# Patient Record
Sex: MALE | Race: WHITE | NOT HISPANIC OR LATINO
[De-identification: names, ages, dates, MRNs, and addresses within clinical notes are randomized per-mention and may not be internally consistent; named-entity substitution may affect disease eponyms.]

---

## 2019-09-11 PROBLEM — Z00.00 ENCOUNTER FOR PREVENTIVE HEALTH EXAMINATION: Status: ACTIVE | Noted: 2019-09-11

## 2019-09-24 ENCOUNTER — APPOINTMENT (OUTPATIENT)
Dept: UROLOGY | Facility: CLINIC | Age: 74
End: 2019-09-24
Payer: MEDICARE

## 2019-09-24 DIAGNOSIS — C61 MALIGNANT NEOPLASM OF PROSTATE: ICD-10-CM

## 2019-09-24 PROCEDURE — 99204 OFFICE O/P NEW MOD 45 MIN: CPT

## 2019-09-24 NOTE — HISTORY OF PRESENT ILLNESS
[FreeTextEntry1] : CC: Prostate cancer\par \par HPI:   Dr. Olivera performed biopsy on this 73 year old healthy man for PSA of "four point something".  MRI was performed.  Biopsy revealed GS6 and 7 on right side, base, mid and apex.  Negative left side. \par This was first prostate biopsy.  \par \par No major urinary complaints.   Erections "not the greatest".  He is not currently on PDEI or other medications, however, he has tried them and "they seem to work".  Without medication, erections are probably not sufficient for vaginal penetration.   \par \par FAMHX: Negative CAP \par SURGHX: Hernia, Appendectomy \par SOCIAL:  Nonsmoker, , no children, active, exercise regularly \par ROS: Negative 10 system review \par

## 2019-09-24 NOTE — ASSESSMENT
[FreeTextEntry1] : Intermediate risk CAP\par Today we discussed the natural history of localized prostate cancer, and the heterogeneous biology of this malignancy.  We discussed the fact that many prostate cancers are slow growing and unlikely to metastasize or cause death, whereas others can be life threatening.  We reviewed risk stratification, staging, Tima scoring, and PSA levels as they pertain to risk.  \par \par All treatment options were reviewed.  This included active surveillance, androgen deprivation, emerging options such as focal therapy/HIFU/cryotherapy, radiation options (including IMRT, SBRT, brachytherapy) and surgical options (open vs. robotic surgery, nerve vs. non-nerve sparing).  Oncologic outcomes were compared and contrasted.  Risks of biochemical and clinical recurrence discussed.  Risks of needing adjuvant or salvage treatments reviewed.  We discussed the risks of secondary malignancy after radiation.  We discussed the opportunity for pathological staging with surgery vs. other options.  We discussed the possibility of positive margins, treatment failure, cancer recurrence and cancer-related death even with treatment. \par \par All potential side effects of treatment were reviewed including, but not limited to: short term or permanent stress urinary incontinence and/or short term or permanent erectile dysfunction, penile shortening, rectal symptoms/pain, perineal pain, and other side effects. \par \par All potential complications of treatment and surgery were reviewed including, but not limited to: risks of conversion from MIS to open surgery discussed, bleeding//life-threatening hemorrhage, rectal injury requiring colostomy or delayed recognition leading to fistula, vascular/bowel/adjacent visceral organ injury, trocar/access injury, the possibility of recognized vs. unrecognized/delayed-recognition injury, risks of thermal/blunt/sharp/retraction injury, risks of DVT, PE, MI, death, risks of cardiopulmonary/anesthesia related complications, positional injury, infection/collection/abscess, wound complications/dehiscence/seroma/cellulitis, urinoma/fistula, ureteral injury/obstruction, bladder neck contracture, penile shortening, meatal stenosis, urethral stricture, lymphocele, obturator nerve injury, prolonged anastomotic leak, and other complications. \par \par \par \par \par \par \par \par

## 2019-09-27 ENCOUNTER — RESULT REVIEW (OUTPATIENT)
Age: 74
End: 2019-09-27

## 2019-09-27 ENCOUNTER — OUTPATIENT (OUTPATIENT)
Dept: OUTPATIENT SERVICES | Facility: HOSPITAL | Age: 74
LOS: 1 days | End: 2019-09-27
Payer: MEDICARE

## 2019-09-27 DIAGNOSIS — C61 MALIGNANT NEOPLASM OF PROSTATE: ICD-10-CM

## 2019-09-27 LAB — SURGICAL PATHOLOGY STUDY: SIGNIFICANT CHANGE UP

## 2019-09-27 PROCEDURE — 88321 CONSLTJ&REPRT SLD PREP ELSWR: CPT

## 2019-11-01 VITALS
HEART RATE: 70 BPM | WEIGHT: 186.73 LBS | TEMPERATURE: 98 F | RESPIRATION RATE: 18 BRPM | HEIGHT: 70 IN | SYSTOLIC BLOOD PRESSURE: 159 MMHG | DIASTOLIC BLOOD PRESSURE: 82 MMHG | OXYGEN SATURATION: 96 %

## 2019-11-01 RX ORDER — AMOXICILLIN AND CLAVULANATE POTASSIUM 875; 125 MG/1; MG/1
875-125 TABLET, COATED ORAL
Qty: 6 | Refills: 0 | Status: ACTIVE | COMMUNITY
Start: 2019-11-01 | End: 1900-01-01

## 2019-11-01 NOTE — PATIENT PROFILE ADULT - NSPROEDALEARNPREF_GEN_A_NUR
individual instruction/verbal instruction/written material audio/written material/verbal instruction/individual instruction

## 2019-11-04 ENCOUNTER — APPOINTMENT (OUTPATIENT)
Dept: UROLOGY | Facility: HOSPITAL | Age: 74
End: 2019-11-04

## 2019-11-04 ENCOUNTER — RESULT REVIEW (OUTPATIENT)
Age: 74
End: 2019-11-04

## 2019-11-04 ENCOUNTER — INPATIENT (INPATIENT)
Facility: HOSPITAL | Age: 74
LOS: 1 days | Discharge: HOME | DRG: 708 | End: 2019-11-06
Attending: UROLOGY | Admitting: UROLOGY
Payer: MEDICARE

## 2019-11-04 DIAGNOSIS — Z90.49 ACQUIRED ABSENCE OF OTHER SPECIFIED PARTS OF DIGESTIVE TRACT: Chronic | ICD-10-CM

## 2019-11-04 DIAGNOSIS — Z98.49 CATARACT EXTRACTION STATUS, UNSPECIFIED EYE: Chronic | ICD-10-CM

## 2019-11-04 DIAGNOSIS — Z98.890 OTHER SPECIFIED POSTPROCEDURAL STATES: Chronic | ICD-10-CM

## 2019-11-04 DIAGNOSIS — Z90.89 ACQUIRED ABSENCE OF OTHER ORGANS: Chronic | ICD-10-CM

## 2019-11-04 LAB
ANION GAP SERPL CALC-SCNC: 11 MMOL/L — SIGNIFICANT CHANGE UP (ref 5–17)
BASOPHILS # BLD AUTO: 0.04 K/UL — SIGNIFICANT CHANGE UP (ref 0–0.2)
BASOPHILS NFR BLD AUTO: 0.2 % — SIGNIFICANT CHANGE UP (ref 0–2)
BUN SERPL-MCNC: 16 MG/DL — SIGNIFICANT CHANGE UP (ref 7–23)
CALCIUM SERPL-MCNC: 8.6 MG/DL — SIGNIFICANT CHANGE UP (ref 8.4–10.5)
CHLORIDE SERPL-SCNC: 105 MMOL/L — SIGNIFICANT CHANGE UP (ref 96–108)
CO2 SERPL-SCNC: 26 MMOL/L — SIGNIFICANT CHANGE UP (ref 22–31)
CREAT SERPL-MCNC: 0.94 MG/DL — SIGNIFICANT CHANGE UP (ref 0.5–1.3)
EOSINOPHIL # BLD AUTO: 0.04 K/UL — SIGNIFICANT CHANGE UP (ref 0–0.5)
EOSINOPHIL NFR BLD AUTO: 0.2 % — SIGNIFICANT CHANGE UP (ref 0–6)
GLUCOSE BLDC GLUCOMTR-MCNC: 137 MG/DL — HIGH (ref 70–99)
GLUCOSE BLDC GLUCOMTR-MCNC: 90 MG/DL — SIGNIFICANT CHANGE UP (ref 70–99)
GLUCOSE SERPL-MCNC: 154 MG/DL — HIGH (ref 70–99)
HCT VFR BLD CALC: 44 % — SIGNIFICANT CHANGE UP (ref 39–50)
HGB BLD-MCNC: 14.7 G/DL — SIGNIFICANT CHANGE UP (ref 13–17)
IMM GRANULOCYTES NFR BLD AUTO: 0.3 % — SIGNIFICANT CHANGE UP (ref 0–1.5)
LYMPHOCYTES # BLD AUTO: 1.11 K/UL — SIGNIFICANT CHANGE UP (ref 1–3.3)
LYMPHOCYTES # BLD AUTO: 5.8 % — LOW (ref 13–44)
MCHC RBC-ENTMCNC: 32.6 PG — SIGNIFICANT CHANGE UP (ref 27–34)
MCHC RBC-ENTMCNC: 33.4 GM/DL — SIGNIFICANT CHANGE UP (ref 32–36)
MCV RBC AUTO: 97.6 FL — SIGNIFICANT CHANGE UP (ref 80–100)
MONOCYTES # BLD AUTO: 0.38 K/UL — SIGNIFICANT CHANGE UP (ref 0–0.9)
MONOCYTES NFR BLD AUTO: 2 % — SIGNIFICANT CHANGE UP (ref 2–14)
NEUTROPHILS # BLD AUTO: 17.4 K/UL — HIGH (ref 1.8–7.4)
NEUTROPHILS NFR BLD AUTO: 91.5 % — HIGH (ref 43–77)
NRBC # BLD: 0 /100 WBCS — SIGNIFICANT CHANGE UP (ref 0–0)
PLATELET # BLD AUTO: 279 K/UL — SIGNIFICANT CHANGE UP (ref 150–400)
POTASSIUM SERPL-MCNC: 4.3 MMOL/L — SIGNIFICANT CHANGE UP (ref 3.5–5.3)
POTASSIUM SERPL-SCNC: 4.3 MMOL/L — SIGNIFICANT CHANGE UP (ref 3.5–5.3)
RBC # BLD: 4.51 M/UL — SIGNIFICANT CHANGE UP (ref 4.2–5.8)
RBC # FLD: 11.9 % — SIGNIFICANT CHANGE UP (ref 10.3–14.5)
SODIUM SERPL-SCNC: 142 MMOL/L — SIGNIFICANT CHANGE UP (ref 135–145)
WBC # BLD: 19.03 K/UL — HIGH (ref 3.8–10.5)
WBC # FLD AUTO: 19.03 K/UL — HIGH (ref 3.8–10.5)

## 2019-11-04 PROCEDURE — 38571 LAPAROSCOPY LYMPHADENECTOMY: CPT

## 2019-11-04 PROCEDURE — 55866 LAPS SURG PRST8ECT RPBIC RAD: CPT

## 2019-11-04 RX ORDER — SODIUM CHLORIDE 9 MG/ML
1000 INJECTION, SOLUTION INTRAVENOUS
Refills: 0 | Status: DISCONTINUED | OUTPATIENT
Start: 2019-11-04 | End: 2019-11-06

## 2019-11-04 RX ORDER — LISINOPRIL 2.5 MG/1
1 TABLET ORAL
Qty: 0 | Refills: 0 | DISCHARGE

## 2019-11-04 RX ORDER — KETOROLAC TROMETHAMINE 30 MG/ML
15 SYRINGE (ML) INJECTION EVERY 6 HOURS
Refills: 0 | Status: COMPLETED | OUTPATIENT
Start: 2019-11-04 | End: 2019-11-06

## 2019-11-04 RX ORDER — ENOXAPARIN SODIUM 100 MG/ML
40 INJECTION SUBCUTANEOUS DAILY
Refills: 0 | Status: DISCONTINUED | OUTPATIENT
Start: 2019-11-05 | End: 2019-11-04

## 2019-11-04 RX ORDER — GABAPENTIN 400 MG/1
600 CAPSULE ORAL ONCE
Refills: 0 | Status: COMPLETED | OUTPATIENT
Start: 2019-11-04 | End: 2019-11-04

## 2019-11-04 RX ORDER — LIDOCAINE 4 G/100G
1 CREAM TOPICAL
Refills: 0 | Status: DISCONTINUED | OUTPATIENT
Start: 2019-11-04 | End: 2019-11-06

## 2019-11-04 RX ORDER — SENNA PLUS 8.6 MG/1
2 TABLET ORAL AT BEDTIME
Refills: 0 | Status: DISCONTINUED | OUTPATIENT
Start: 2019-11-04 | End: 2019-11-06

## 2019-11-04 RX ORDER — DIAZEPAM 5 MG
5 TABLET ORAL THREE TIMES A DAY
Refills: 0 | Status: DISCONTINUED | OUTPATIENT
Start: 2019-11-04 | End: 2019-11-06

## 2019-11-04 RX ORDER — CEFAZOLIN SODIUM 1 G
VIAL (EA) INJECTION
Refills: 0 | Status: DISCONTINUED | OUTPATIENT
Start: 2019-11-04 | End: 2019-11-04

## 2019-11-04 RX ORDER — BUPIVACAINE 13.3 MG/ML
20 INJECTION, SUSPENSION, LIPOSOMAL INFILTRATION ONCE
Refills: 0 | Status: DISCONTINUED | OUTPATIENT
Start: 2019-11-04 | End: 2019-11-06

## 2019-11-04 RX ORDER — VITAMIN E 100 UNIT
0 CAPSULE ORAL
Qty: 0 | Refills: 0 | DISCHARGE

## 2019-11-04 RX ORDER — CEFAZOLIN SODIUM 1 G
2000 VIAL (EA) INJECTION EVERY 8 HOURS
Refills: 0 | Status: COMPLETED | OUTPATIENT
Start: 2019-11-04 | End: 2019-11-05

## 2019-11-04 RX ORDER — ACETAMINOPHEN 500 MG
1000 TABLET ORAL ONCE
Refills: 0 | Status: COMPLETED | OUTPATIENT
Start: 2019-11-04 | End: 2019-11-04

## 2019-11-04 RX ORDER — ACETAMINOPHEN 500 MG
1000 TABLET ORAL EVERY 6 HOURS
Refills: 0 | Status: DISCONTINUED | OUTPATIENT
Start: 2019-11-04 | End: 2019-11-06

## 2019-11-04 RX ORDER — OXYCODONE HYDROCHLORIDE 5 MG/1
10 TABLET ORAL EVERY 4 HOURS
Refills: 0 | Status: DISCONTINUED | OUTPATIENT
Start: 2019-11-04 | End: 2019-11-06

## 2019-11-04 RX ORDER — OXYCODONE HYDROCHLORIDE 5 MG/1
5 TABLET ORAL EVERY 4 HOURS
Refills: 0 | Status: DISCONTINUED | OUTPATIENT
Start: 2019-11-04 | End: 2019-11-06

## 2019-11-04 RX ORDER — METOCLOPRAMIDE HCL 10 MG
10 TABLET ORAL EVERY 6 HOURS
Refills: 0 | Status: DISCONTINUED | OUTPATIENT
Start: 2019-11-04 | End: 2019-11-06

## 2019-11-04 RX ORDER — ENOXAPARIN SODIUM 100 MG/ML
40 INJECTION SUBCUTANEOUS EVERY 24 HOURS
Refills: 0 | Status: DISCONTINUED | OUTPATIENT
Start: 2019-11-05 | End: 2019-11-06

## 2019-11-04 RX ORDER — ENOXAPARIN SODIUM 100 MG/ML
40 INJECTION SUBCUTANEOUS DAILY
Refills: 0 | Status: DISCONTINUED | OUTPATIENT
Start: 2019-11-04 | End: 2019-11-04

## 2019-11-04 RX ORDER — ENOXAPARIN SODIUM 100 MG/ML
40 INJECTION SUBCUTANEOUS ONCE
Refills: 0 | Status: COMPLETED | OUTPATIENT
Start: 2019-11-04 | End: 2019-11-04

## 2019-11-04 RX ORDER — HYDROMORPHONE HYDROCHLORIDE 2 MG/ML
0.5 INJECTION INTRAMUSCULAR; INTRAVENOUS; SUBCUTANEOUS
Refills: 0 | Status: DISCONTINUED | OUTPATIENT
Start: 2019-11-04 | End: 2019-11-06

## 2019-11-04 RX ORDER — OMEGA-3 ACID ETHYL ESTERS 1 G
1 CAPSULE ORAL
Qty: 0 | Refills: 0 | DISCHARGE

## 2019-11-04 RX ORDER — SIMVASTATIN 20 MG/1
1 TABLET, FILM COATED ORAL
Qty: 0 | Refills: 0 | DISCHARGE

## 2019-11-04 RX ORDER — ONDANSETRON 8 MG/1
4 TABLET, FILM COATED ORAL EVERY 6 HOURS
Refills: 0 | Status: DISCONTINUED | OUTPATIENT
Start: 2019-11-04 | End: 2019-11-06

## 2019-11-04 RX ORDER — OXYBUTYNIN CHLORIDE 5 MG
5 TABLET ORAL THREE TIMES A DAY
Refills: 0 | Status: DISCONTINUED | OUTPATIENT
Start: 2019-11-04 | End: 2019-11-06

## 2019-11-04 RX ADMIN — GABAPENTIN 600 MILLIGRAM(S): 400 CAPSULE ORAL at 06:22

## 2019-11-04 RX ADMIN — LIDOCAINE 1 APPLICATION(S): 4 CREAM TOPICAL at 21:52

## 2019-11-04 RX ADMIN — Medication 15 MILLIGRAM(S): at 23:12

## 2019-11-04 RX ADMIN — Medication 2000 MILLIGRAM(S): at 14:48

## 2019-11-04 RX ADMIN — Medication 1000 MILLIGRAM(S): at 18:47

## 2019-11-04 RX ADMIN — Medication 1000 MILLIGRAM(S): at 17:52

## 2019-11-04 RX ADMIN — SODIUM CHLORIDE 75 MILLILITER(S): 9 INJECTION, SOLUTION INTRAVENOUS at 12:58

## 2019-11-04 RX ADMIN — LIDOCAINE 1 APPLICATION(S): 4 CREAM TOPICAL at 12:08

## 2019-11-04 RX ADMIN — LIDOCAINE 1 APPLICATION(S): 4 CREAM TOPICAL at 23:16

## 2019-11-04 RX ADMIN — Medication 1000 MILLIGRAM(S): at 06:22

## 2019-11-04 RX ADMIN — ENOXAPARIN SODIUM 40 MILLIGRAM(S): 100 INJECTION SUBCUTANEOUS at 06:28

## 2019-11-04 RX ADMIN — Medication 1000 MILLIGRAM(S): at 23:13

## 2019-11-04 RX ADMIN — Medication 1000 MILLIGRAM(S): at 12:49

## 2019-11-04 RX ADMIN — LIDOCAINE 1 APPLICATION(S): 4 CREAM TOPICAL at 17:53

## 2019-11-04 RX ADMIN — Medication 1000 MILLIGRAM(S): at 12:16

## 2019-11-04 RX ADMIN — Medication 2000 MILLIGRAM(S): at 21:52

## 2019-11-04 NOTE — PROGRESS NOTE ADULT - SUBJECTIVE AND OBJECTIVE BOX
POST OP NOTE:    Patient states has some abdominal incisional discomfort very mild and mild urethra catheter associated tenderness,  denies any nausea or vomiting, denies fever or chills, denies SOB or CP.       11-04-19 @ 07:01  -  11-04-19 @ 17:07  --------------------------------------------------------  IN: 700 mL / OUT: 395 mL / NET: 305 mL      T(C): 36.7 (11-04-19 @ 13:38), Max: 37.3 (11-04-19 @ 12:17)  HR: 82 (11-04-19 @ 13:38) (64 - 82)  BP: 137/74 (11-04-19 @ 13:38) (115/63 - 137/74)  RR: 16 (11-04-19 @ 13:38) (7 - 18)  SpO2: 97% (11-04-19 @ 13:38) (91% - 98%)    GEN: NAD  ABD: Soft, appropriately tender to palpation, surgical laparoscopic wounds clean and without discharge  : Sevilla catheter in, draining clear output, no swelling, discharge, bleeding        A/P 72 YO M with history of prostate cancer s/p Prostatectomy, radical, laparoscopic, retropubic, robot-assisted 04-Nov-2019  1) OOB, IS  2) Monitor sevilla output- I&O's  3)Pain management PRN  4) ambulate with assistance as tolerated  5) am labs  6) advance diet

## 2019-11-04 NOTE — BRIEF OPERATIVE NOTE - NSICDXBRIEFPROCEDURE_GEN_ALL_CORE_FT
PROCEDURES:  Prostatectomy, radical, laparoscopic, retropubic, robot-assisted 04-Nov-2019 11:20:25 Hussain Peters

## 2019-11-04 NOTE — BRIEF OPERATIVE NOTE - NSICDXBRIEFPOSTOP_GEN_ALL_CORE_FT
[facility] Advanced Assessment Note





- Skin Integrity Problem, Advanced Assess


  ** Left Sacrum Pressure Injury


Dressing Type: Open to Air


Dressing Description: Clean/Dry, Intact


Exudate Amount: None


Josette Wound Tissue: Erythema


Wound Bed Color: Black


Wound Bed Constitution: Stable Eschar


Wound Edges: Not Attached


Site Measurement - Head-to-Toe Length X Width X Depth (cm): 0.8x0.5xeschar


Pressure Injury Stage: Unstageable


Pressure Injury Present on Admit: No


Skin Integrity Problem Comment: Patient with small unstagable pressure injury 

to lower left sacrum. The edges of the wound were lifting so some of the eschar 

was able to be trimmed off. Will begin to moisturize and will ask nursing to 

cover area with border foam dressing. Wound care will round again next week. 

Report given to Amita EDWARDS RN. POST-OP DIAGNOSIS:  Prostate cancer 04-Nov-2019 11:20:50  Hussain Dill

## 2019-11-05 LAB
ANION GAP SERPL CALC-SCNC: 10 MMOL/L — SIGNIFICANT CHANGE UP (ref 5–17)
BASOPHILS # BLD AUTO: 0.01 K/UL — SIGNIFICANT CHANGE UP (ref 0–0.2)
BASOPHILS NFR BLD AUTO: 0.1 % — SIGNIFICANT CHANGE UP (ref 0–2)
BUN SERPL-MCNC: 15 MG/DL — SIGNIFICANT CHANGE UP (ref 7–23)
CALCIUM SERPL-MCNC: 8.4 MG/DL — SIGNIFICANT CHANGE UP (ref 8.4–10.5)
CHLORIDE SERPL-SCNC: 103 MMOL/L — SIGNIFICANT CHANGE UP (ref 96–108)
CO2 SERPL-SCNC: 26 MMOL/L — SIGNIFICANT CHANGE UP (ref 22–31)
CREAT FLD-MCNC: 1.05 MG/DL — SIGNIFICANT CHANGE UP
CREAT SERPL-MCNC: 0.99 MG/DL — SIGNIFICANT CHANGE UP (ref 0.5–1.3)
EOSINOPHIL # BLD AUTO: 0.01 K/UL — SIGNIFICANT CHANGE UP (ref 0–0.5)
EOSINOPHIL NFR BLD AUTO: 0.1 % — SIGNIFICANT CHANGE UP (ref 0–6)
GLUCOSE SERPL-MCNC: 105 MG/DL — HIGH (ref 70–99)
HCT VFR BLD CALC: 39.2 % — SIGNIFICANT CHANGE UP (ref 39–50)
HGB BLD-MCNC: 12.7 G/DL — LOW (ref 13–17)
IMM GRANULOCYTES NFR BLD AUTO: 0.4 % — SIGNIFICANT CHANGE UP (ref 0–1.5)
LYMPHOCYTES # BLD AUTO: 18.9 % — SIGNIFICANT CHANGE UP (ref 13–44)
LYMPHOCYTES # BLD AUTO: 2.14 K/UL — SIGNIFICANT CHANGE UP (ref 1–3.3)
MAGNESIUM SERPL-MCNC: 1.7 MG/DL — SIGNIFICANT CHANGE UP (ref 1.6–2.6)
MCHC RBC-ENTMCNC: 32.4 GM/DL — SIGNIFICANT CHANGE UP (ref 32–36)
MCHC RBC-ENTMCNC: 32.5 PG — SIGNIFICANT CHANGE UP (ref 27–34)
MCV RBC AUTO: 100.3 FL — HIGH (ref 80–100)
MONOCYTES # BLD AUTO: 1.21 K/UL — HIGH (ref 0–0.9)
MONOCYTES NFR BLD AUTO: 10.7 % — SIGNIFICANT CHANGE UP (ref 2–14)
NEUTROPHILS # BLD AUTO: 7.9 K/UL — HIGH (ref 1.8–7.4)
NEUTROPHILS NFR BLD AUTO: 69.8 % — SIGNIFICANT CHANGE UP (ref 43–77)
NRBC # BLD: 0 /100 WBCS — SIGNIFICANT CHANGE UP (ref 0–0)
PHOSPHATE SERPL-MCNC: 3.4 MG/DL — SIGNIFICANT CHANGE UP (ref 2.5–4.5)
PLATELET # BLD AUTO: 255 K/UL — SIGNIFICANT CHANGE UP (ref 150–400)
POTASSIUM SERPL-MCNC: 4.4 MMOL/L — SIGNIFICANT CHANGE UP (ref 3.5–5.3)
POTASSIUM SERPL-SCNC: 4.4 MMOL/L — SIGNIFICANT CHANGE UP (ref 3.5–5.3)
RBC # BLD: 3.91 M/UL — LOW (ref 4.2–5.8)
RBC # FLD: 12 % — SIGNIFICANT CHANGE UP (ref 10.3–14.5)
SODIUM SERPL-SCNC: 139 MMOL/L — SIGNIFICANT CHANGE UP (ref 135–145)
WBC # BLD: 11.31 K/UL — HIGH (ref 3.8–10.5)
WBC # FLD AUTO: 11.31 K/UL — HIGH (ref 3.8–10.5)

## 2019-11-05 RX ORDER — MAGNESIUM OXIDE 400 MG ORAL TABLET 241.3 MG
800 TABLET ORAL ONCE
Refills: 0 | Status: COMPLETED | OUTPATIENT
Start: 2019-11-05 | End: 2019-11-05

## 2019-11-05 RX ORDER — SIMVASTATIN 20 MG/1
20 TABLET, FILM COATED ORAL AT BEDTIME
Refills: 0 | Status: DISCONTINUED | OUTPATIENT
Start: 2019-11-05 | End: 2019-11-06

## 2019-11-05 RX ORDER — LISINOPRIL 2.5 MG/1
5 TABLET ORAL DAILY
Refills: 0 | Status: DISCONTINUED | OUTPATIENT
Start: 2019-11-05 | End: 2019-11-06

## 2019-11-05 RX ADMIN — Medication 1000 MILLIGRAM(S): at 12:18

## 2019-11-05 RX ADMIN — Medication 15 MILLIGRAM(S): at 12:18

## 2019-11-05 RX ADMIN — Medication 15 MILLIGRAM(S): at 23:17

## 2019-11-05 RX ADMIN — Medication 15 MILLIGRAM(S): at 18:12

## 2019-11-05 RX ADMIN — Medication 1000 MILLIGRAM(S): at 06:13

## 2019-11-05 RX ADMIN — Medication 2000 MILLIGRAM(S): at 06:14

## 2019-11-05 RX ADMIN — Medication 1000 MILLIGRAM(S): at 18:12

## 2019-11-05 RX ADMIN — LIDOCAINE 1 APPLICATION(S): 4 CREAM TOPICAL at 15:05

## 2019-11-05 RX ADMIN — Medication 15 MILLIGRAM(S): at 23:32

## 2019-11-05 RX ADMIN — OXYCODONE HYDROCHLORIDE 10 MILLIGRAM(S): 5 TABLET ORAL at 10:32

## 2019-11-05 RX ADMIN — Medication 15 MILLIGRAM(S): at 00:12

## 2019-11-05 RX ADMIN — Medication 1000 MILLIGRAM(S): at 17:37

## 2019-11-05 RX ADMIN — Medication 15 MILLIGRAM(S): at 17:37

## 2019-11-05 RX ADMIN — LIDOCAINE 1 APPLICATION(S): 4 CREAM TOPICAL at 06:14

## 2019-11-05 RX ADMIN — Medication 1000 MILLIGRAM(S): at 23:17

## 2019-11-05 RX ADMIN — Medication 15 MILLIGRAM(S): at 12:17

## 2019-11-05 RX ADMIN — MAGNESIUM OXIDE 400 MG ORAL TABLET 800 MILLIGRAM(S): 241.3 TABLET ORAL at 12:19

## 2019-11-05 RX ADMIN — ENOXAPARIN SODIUM 40 MILLIGRAM(S): 100 INJECTION SUBCUTANEOUS at 06:14

## 2019-11-05 RX ADMIN — LIDOCAINE 1 APPLICATION(S): 4 CREAM TOPICAL at 23:21

## 2019-11-05 RX ADMIN — LIDOCAINE 1 APPLICATION(S): 4 CREAM TOPICAL at 10:34

## 2019-11-05 RX ADMIN — LIDOCAINE 1 APPLICATION(S): 4 CREAM TOPICAL at 17:38

## 2019-11-05 RX ADMIN — LIDOCAINE 1 APPLICATION(S): 4 CREAM TOPICAL at 21:49

## 2019-11-05 RX ADMIN — Medication 1000 MILLIGRAM(S): at 07:13

## 2019-11-05 RX ADMIN — Medication 15 MILLIGRAM(S): at 06:14

## 2019-11-05 RX ADMIN — LISINOPRIL 5 MILLIGRAM(S): 2.5 TABLET ORAL at 10:19

## 2019-11-05 RX ADMIN — OXYCODONE HYDROCHLORIDE 10 MILLIGRAM(S): 5 TABLET ORAL at 11:32

## 2019-11-05 RX ADMIN — Medication 1000 MILLIGRAM(S): at 00:13

## 2019-11-05 RX ADMIN — Medication 15 MILLIGRAM(S): at 06:29

## 2019-11-05 RX ADMIN — LIDOCAINE 1 APPLICATION(S): 4 CREAM TOPICAL at 12:18

## 2019-11-05 NOTE — PROGRESS NOTE ADULT - SUBJECTIVE AND OBJECTIVE BOX
INTERVAL HPI/OVERNIGHT EVENTS:  No acute events overnight.    VITALS:    T(F): 98.2 (11-05-19 @ 05:13), Max: 99.2 (11-04-19 @ 12:17)  HR: 60 (11-05-19 @ 05:13) (60 - 82)  BP: 110/79 (11-05-19 @ 05:13) (110/79 - 137/74)  RR: 18 (11-05-19 @ 05:13) (7 - 20)  SpO2: 96% (11-05-19 @ 05:13) (91% - 98%)  Wt(kg): --    I&O's Detail    04 Nov 2019 07:01  -  05 Nov 2019 05:59  --------------------------------------------------------  IN:    lactated ringers.: 1350 mL    Oral Fluid: 250 mL  Total IN: 1600 mL    OUT:    Bulb: 100 mL    Indwelling Catheter - Urethral: 1795 mL  Total OUT: 1895 mL    Total NET: -295 mL          MEDICATIONS:    ANTIBIOTICS:  ceFAZolin  Injectable. 2000 milliGRAM(s) IV Push every 8 hours      PAIN CONTROL:  acetaminophen   Tablet .. 1000 milliGRAM(s) Oral every 6 hours  diazepam    Tablet 5 milliGRAM(s) Oral three times a day PRN  HYDROmorphone  Injectable 0.5 milliGRAM(s) IV Push every 3 hours PRN  ketorolac   Injectable 15 milliGRAM(s) IV Push every 6 hours  metoclopramide Injectable 10 milliGRAM(s) IV Push every 6 hours PRN  ondansetron Injectable 4 milliGRAM(s) IV Push every 6 hours PRN  oxyCODONE    IR 5 milliGRAM(s) Oral every 4 hours PRN  oxyCODONE    IR 10 milliGRAM(s) Oral every 4 hours PRN       MEDS:  oxybutynin 5 milliGRAM(s) Oral three times a day PRN      HEME/ONC  enoxaparin Injectable 40 milliGRAM(s) SubCutaneous every 24 hours        PHYSICAL EXAM:  General: No acute distress.  Alert and Oriented  Abdominal Exam: soft nt/nd. Incision sites c/d/i. DA drain noted w/ serosanguinous output.   Exam: Blair cath in place draining well.      LABS:                        14.7   19.03 )-----------( 279      ( 04 Nov 2019 11:49 )             44.0     11-04    142  |  105  |  16  ----------------------------<  154<H>  4.3   |  26  |  0.94    Ca    8.6      04 Nov 2019 11:49            RADIOLOGY & ADDITIONAL TESTS:    ASSESSMENT: 73yMale w/ Prostate Cancer s/p RALP.     PLAN:  -Diet: clears  -Pain control  -Monitor Urine Output  -Monitor DA output  -DVT ppx  -Cont Abx: Cefazolin  -OOB/IS

## 2019-11-06 ENCOUNTER — TRANSCRIPTION ENCOUNTER (OUTPATIENT)
Age: 74
End: 2019-11-06

## 2019-11-06 VITALS
OXYGEN SATURATION: 94 % | DIASTOLIC BLOOD PRESSURE: 75 MMHG | RESPIRATION RATE: 17 BRPM | HEART RATE: 78 BPM | TEMPERATURE: 99 F | SYSTOLIC BLOOD PRESSURE: 125 MMHG

## 2019-11-06 LAB
ANION GAP SERPL CALC-SCNC: 9 MMOL/L — SIGNIFICANT CHANGE UP (ref 5–17)
BUN SERPL-MCNC: 13 MG/DL — SIGNIFICANT CHANGE UP (ref 7–23)
CALCIUM SERPL-MCNC: 8.4 MG/DL — SIGNIFICANT CHANGE UP (ref 8.4–10.5)
CHLORIDE SERPL-SCNC: 105 MMOL/L — SIGNIFICANT CHANGE UP (ref 96–108)
CO2 SERPL-SCNC: 27 MMOL/L — SIGNIFICANT CHANGE UP (ref 22–31)
CREAT SERPL-MCNC: 0.95 MG/DL — SIGNIFICANT CHANGE UP (ref 0.5–1.3)
GLUCOSE SERPL-MCNC: 95 MG/DL — SIGNIFICANT CHANGE UP (ref 70–99)
HCT VFR BLD CALC: 38.5 % — LOW (ref 39–50)
HGB BLD-MCNC: 12.4 G/DL — LOW (ref 13–17)
MAGNESIUM SERPL-MCNC: 1.8 MG/DL — SIGNIFICANT CHANGE UP (ref 1.6–2.6)
MCHC RBC-ENTMCNC: 32.2 GM/DL — SIGNIFICANT CHANGE UP (ref 32–36)
MCHC RBC-ENTMCNC: 32.5 PG — SIGNIFICANT CHANGE UP (ref 27–34)
MCV RBC AUTO: 100.8 FL — HIGH (ref 80–100)
NRBC # BLD: 0 /100 WBCS — SIGNIFICANT CHANGE UP (ref 0–0)
PHOSPHATE SERPL-MCNC: 3 MG/DL — SIGNIFICANT CHANGE UP (ref 2.5–4.5)
PLATELET # BLD AUTO: 229 K/UL — SIGNIFICANT CHANGE UP (ref 150–400)
POTASSIUM SERPL-MCNC: 4.1 MMOL/L — SIGNIFICANT CHANGE UP (ref 3.5–5.3)
POTASSIUM SERPL-SCNC: 4.1 MMOL/L — SIGNIFICANT CHANGE UP (ref 3.5–5.3)
RBC # BLD: 3.82 M/UL — LOW (ref 4.2–5.8)
RBC # FLD: 12.1 % — SIGNIFICANT CHANGE UP (ref 10.3–14.5)
SODIUM SERPL-SCNC: 141 MMOL/L — SIGNIFICANT CHANGE UP (ref 135–145)
WBC # BLD: 8.18 K/UL — SIGNIFICANT CHANGE UP (ref 3.8–10.5)
WBC # FLD AUTO: 8.18 K/UL — SIGNIFICANT CHANGE UP (ref 3.8–10.5)

## 2019-11-06 RX ORDER — DOCUSATE SODIUM 100 MG
1 CAPSULE ORAL
Qty: 42 | Refills: 0
Start: 2019-11-06 | End: 2019-11-26

## 2019-11-06 RX ORDER — AZTREONAM 2 G
160 VIAL (EA) INJECTION
Qty: 6 | Refills: 0
Start: 2019-11-06 | End: 2019-11-08

## 2019-11-06 RX ORDER — MAGNESIUM SULFATE 500 MG/ML
1 VIAL (ML) INJECTION ONCE
Refills: 0 | Status: COMPLETED | OUTPATIENT
Start: 2019-11-06 | End: 2019-11-06

## 2019-11-06 RX ORDER — SODIUM CHLORIDE 9 MG/ML
3 INJECTION INTRAMUSCULAR; INTRAVENOUS; SUBCUTANEOUS EVERY 8 HOURS
Refills: 0 | Status: DISCONTINUED | OUTPATIENT
Start: 2019-11-06 | End: 2019-11-06

## 2019-11-06 RX ADMIN — Medication 15 MILLIGRAM(S): at 06:09

## 2019-11-06 RX ADMIN — Medication 1000 MILLIGRAM(S): at 12:17

## 2019-11-06 RX ADMIN — LIDOCAINE 1 APPLICATION(S): 4 CREAM TOPICAL at 06:10

## 2019-11-06 RX ADMIN — LIDOCAINE 1 APPLICATION(S): 4 CREAM TOPICAL at 09:25

## 2019-11-06 RX ADMIN — Medication 1000 MILLIGRAM(S): at 13:10

## 2019-11-06 RX ADMIN — Medication 15 MILLIGRAM(S): at 06:34

## 2019-11-06 RX ADMIN — Medication 100 GRAM(S): at 10:03

## 2019-11-06 RX ADMIN — Medication 15 MILLIGRAM(S): at 12:18

## 2019-11-06 RX ADMIN — Medication 1000 MILLIGRAM(S): at 06:09

## 2019-11-06 RX ADMIN — ENOXAPARIN SODIUM 40 MILLIGRAM(S): 100 INJECTION SUBCUTANEOUS at 06:09

## 2019-11-06 RX ADMIN — Medication 15 MILLIGRAM(S): at 12:35

## 2019-11-06 RX ADMIN — Medication 1000 MILLIGRAM(S): at 07:09

## 2019-11-06 RX ADMIN — LISINOPRIL 5 MILLIGRAM(S): 2.5 TABLET ORAL at 06:09

## 2019-11-06 NOTE — DISCHARGE NOTE PROVIDER - NSDCACTIVITY_GEN_ALL_CORE
Showering allowed/No heavy lifting/straining/Do not make important decisions/Walking - Indoors allowed/Walking - Outdoors allowed/Stairs allowed

## 2019-11-06 NOTE — DISCHARGE NOTE PROVIDER - NSDCMRMEDTOKEN_GEN_ALL_CORE_FT
Bactrim  mg-160 mg oral tablet: 160 milligram(s) orally every 12 hours MDD:2  To begin one day prior to sevilla catheter removal   Colace 100 mg oral capsule: 1 cap(s) orally 2 times a day, As Needed -for constipation MDD:2   Fish Oil oral capsule: 1 tab(s) orally once a day  lisinopril 5 mg oral tablet: 1 tab(s) orally once a day  Percocet 5/325 oral tablet: 1 tab(s) orally every 6 hours, As Needed -for moderate pain MDD:4   Zocor 20 mg oral tablet: 1 tab(s) orally once a day (at bedtime)

## 2019-11-06 NOTE — DISCHARGE NOTE NURSING/CASE MANAGEMENT/SOCIAL WORK - PATIENT PORTAL LINK FT
You can access the FollowMyHealth Patient Portal offered by [facility] by registering at the following website: [email or web address]. By joining [employer]’s FollowMyHealth portal, you will also be able to view your health information using other applications (apps) compatible with our system.

## 2019-11-06 NOTE — PROGRESS NOTE ADULT - SUBJECTIVE AND OBJECTIVE BOX
INTERVAL HPI/OVERNIGHT EVENTS:  No acute events overnight.    VITALS:    T(F): 98 (11-06-19 @ 01:14), Max: 98 (11-05-19 @ 18:00)  HR: 62 (11-06-19 @ 01:14) (57 - 72)  BP: 123/74 (11-06-19 @ 01:14) (113/73 - 124/66)  RR: 16 (11-06-19 @ 01:14) (16 - 18)  SpO2: 95% (11-06-19 @ 01:14) (94% - 98%)  Wt(kg): --    I&O's Detail    04 Nov 2019 07:01  -  05 Nov 2019 07:00  --------------------------------------------------------  IN:    lactated ringers.: 1425 mL    Oral Fluid: 250 mL  Total IN: 1675 mL    OUT:    Bulb: 130 mL    Indwelling Catheter - Urethral: 2095 mL  Total OUT: 2225 mL    Total NET: -550 mL      05 Nov 2019 07:01  -  06 Nov 2019 05:28  --------------------------------------------------------  IN:    Oral Fluid: 1200 mL  Total IN: 1200 mL    OUT:    Bulb: 260 mL    Indwelling Catheter - Urethral: 2050 mL  Total OUT: 2310 mL    Total NET: -1110 mL          MEDICATIONS:    ANTIBIOTICS:      PAIN CONTROL:  acetaminophen   Tablet .. 1000 milliGRAM(s) Oral every 6 hours  diazepam    Tablet 5 milliGRAM(s) Oral three times a day PRN  HYDROmorphone  Injectable 0.5 milliGRAM(s) IV Push every 3 hours PRN  ketorolac   Injectable 15 milliGRAM(s) IV Push every 6 hours  metoclopramide Injectable 10 milliGRAM(s) IV Push every 6 hours PRN  ondansetron Injectable 4 milliGRAM(s) IV Push every 6 hours PRN  oxyCODONE    IR 5 milliGRAM(s) Oral every 4 hours PRN  oxyCODONE    IR 10 milliGRAM(s) Oral every 4 hours PRN       MEDS:  oxybutynin 5 milliGRAM(s) Oral three times a day PRN      HEME/ONC  enoxaparin Injectable 40 milliGRAM(s) SubCutaneous every 24 hours        PHYSICAL EXAM:  General: No acute distress.  Alert and Oriented  Abdominal Exam: soft nt/nd. Incision site appear c/d/i. DA drain w/ serosanguinous output.   Exam: Blair cath in place draining yellow output.        LABS:                        12.7   11.31 )-----------( 255      ( 05 Nov 2019 06:47 )             39.2     11-05    139  |  103  |  15  ----------------------------<  105<H>  4.4   |  26  |  0.99    Ca    8.4      05 Nov 2019 06:47  Phos  3.4     11-05  Mg     1.7     11-05            RADIOLOGY & ADDITIONAL TESTS:    ASSESSMENT: 73yMale w CaP s/p RALP      PLAN:  Diet: FLD  Pain control  Monitor DA output  Monitor Urine Output  DVT ppx  OOB/IS

## 2019-11-06 NOTE — DISCHARGE NOTE PROVIDER - HOSPITAL COURSE
72 yo male s/p RALP 11/4/19. Tolerated procedure well. Uneventful post op course. Home with sevilla to leg bag. Follow up as outpt for sevilla removal.

## 2019-11-06 NOTE — DISCHARGE NOTE PROVIDER - NSDCCPCAREPLAN_GEN_ALL_CORE_FT
PRINCIPAL DISCHARGE DIAGNOSIS  Diagnosis: Prostate cancer  Assessment and Plan of Treatment: Continue clear diet until passing Gas or BM, Stay well hydrated, May shower. Keep dressings clean and Dry, can remove 24hrs after discharge. No strenuous activity until you speak with your Surgeon. Hold any aspirin, or anticoagulation therapy until speaking with your surgeon. Call Dr Castro with fever >100.4, nausea, vomiting, if sevilla catheter not draining well, or for questions and to set up your follow up appointment. Sevilla catheter/Leg bag care as instructed by nurses.  Resume pre op home medications, hold any aspirin or NSAIDS.

## 2019-11-06 NOTE — DISCHARGE NOTE PROVIDER - CARE PROVIDER_API CALL
Joni Castro)  Urology  [address] [address]  Phone: [phone]  Fax: [phone]  Follow Up Time:

## 2019-11-12 DIAGNOSIS — I10 ESSENTIAL (PRIMARY) HYPERTENSION: ICD-10-CM

## 2019-11-12 DIAGNOSIS — C61 MALIGNANT NEOPLASM OF PROSTATE: ICD-10-CM

## 2019-11-12 DIAGNOSIS — E78.5 HYPERLIPIDEMIA, UNSPECIFIED: ICD-10-CM

## 2019-11-12 LAB — SURGICAL PATHOLOGY STUDY: SIGNIFICANT CHANGE UP

## 2019-11-12 PROCEDURE — 86900 BLOOD TYPING SEROLOGIC ABO: CPT

## 2019-11-12 PROCEDURE — 84100 ASSAY OF PHOSPHORUS: CPT

## 2019-11-12 PROCEDURE — 88309 TISSUE EXAM BY PATHOLOGIST: CPT

## 2019-11-12 PROCEDURE — 36415 COLL VENOUS BLD VENIPUNCTURE: CPT

## 2019-11-12 PROCEDURE — 88307 TISSUE EXAM BY PATHOLOGIST: CPT

## 2019-11-12 PROCEDURE — 86850 RBC ANTIBODY SCREEN: CPT

## 2019-11-12 PROCEDURE — 85025 COMPLETE CBC W/AUTO DIFF WBC: CPT

## 2019-11-12 PROCEDURE — 86901 BLOOD TYPING SEROLOGIC RH(D): CPT

## 2019-11-12 PROCEDURE — 80048 BASIC METABOLIC PNL TOTAL CA: CPT

## 2019-11-12 PROCEDURE — 83735 ASSAY OF MAGNESIUM: CPT

## 2019-11-12 PROCEDURE — S2900: CPT

## 2019-11-12 PROCEDURE — 82570 ASSAY OF URINE CREATININE: CPT

## 2019-11-12 PROCEDURE — 82962 GLUCOSE BLOOD TEST: CPT

## 2019-11-12 PROCEDURE — 85027 COMPLETE CBC AUTOMATED: CPT

## 2019-11-13 PROBLEM — E78.5 HYPERLIPIDEMIA, UNSPECIFIED: Chronic | Status: ACTIVE | Noted: 2019-11-01

## 2019-11-13 PROBLEM — I10 ESSENTIAL (PRIMARY) HYPERTENSION: Chronic | Status: ACTIVE | Noted: 2019-11-01

## 2019-11-13 PROBLEM — C61 MALIGNANT NEOPLASM OF PROSTATE: Chronic | Status: ACTIVE | Noted: 2019-11-01

## 2019-12-17 ENCOUNTER — APPOINTMENT (OUTPATIENT)
Dept: UROLOGY | Facility: CLINIC | Age: 74
End: 2019-12-17
Payer: MEDICARE

## 2019-12-17 VITALS — HEART RATE: 81 BPM | TEMPERATURE: 98 F | SYSTOLIC BLOOD PRESSURE: 132 MMHG | DIASTOLIC BLOOD PRESSURE: 80 MMHG

## 2019-12-17 LAB — PSA SERPL-MCNC: <0.01 NG/ML

## 2019-12-17 PROCEDURE — 99024 POSTOP FOLLOW-UP VISIT: CPT

## 2019-12-17 NOTE — PHYSICAL EXAM
[General Appearance - Well Developed] : well developed [General Appearance - Well Nourished] : well nourished [Normal Appearance] : normal appearance [Well Groomed] : well groomed [General Appearance - In No Acute Distress] : no acute distress [Abdomen Tenderness] : non-tender [Abdomen Soft] : soft [Urethral Meatus] : meatus normal [Costovertebral Angle Tenderness] : no ~M costovertebral angle tenderness [Penis Abnormality] : normal circumcised penis [Urinary Bladder Findings] : the bladder was normal on palpation [Scrotum] : the scrotum was normal [Epididymis] : the epididymides were normal [Testes Tenderness] : no tenderness of the testes [Testes Mass (___cm)] : there were no testicular masses [] : no respiratory distress [Edema] : no peripheral edema [Respiration, Rhythm And Depth] : normal respiratory rhythm and effort [Exaggerated Use Of Accessory Muscles For Inspiration] : no accessory muscle use [No Palpable Adenopathy] : no palpable adenopathy

## 2019-12-17 NOTE — HISTORY OF PRESENT ILLNESS
[FreeTextEntry1] : CC: Prostate cancer\par \par T3a, GS3+4=7, R0N0\par \par He has some SULMA.  He uses several pads a day.  \par He is doing the Kegel exercises "a little bit". \par

## 2019-12-17 NOTE — ASSESSMENT
[FreeTextEntry1] : CaP\par Discussed Kegel's and stressed the importance to him; discussed physical therapy\par He has not yet really tried to get an erection

## 2020-06-16 ENCOUNTER — APPOINTMENT (OUTPATIENT)
Dept: UROLOGY | Facility: CLINIC | Age: 75
End: 2020-06-16

## 2020-06-26 ENCOUNTER — APPOINTMENT (OUTPATIENT)
Dept: UROLOGY | Facility: CLINIC | Age: 75
End: 2020-06-26
Payer: MEDICARE

## 2020-06-26 PROCEDURE — 99213 OFFICE O/P EST LOW 20 MIN: CPT

## 2020-06-26 NOTE — ASSESSMENT
[FreeTextEntry1] : Patient doing well from functional point of view \par MAYE at this time\par Plan for follow up with Dr. Olivera in November and q 6 mo intervals, with PSA\par \par CC: Dr. Olivera

## 2020-06-26 NOTE — HISTORY OF PRESENT ILLNESS
[FreeTextEntry1] : The patient-doctor relationship has been established in a face to face fashion via real time video/audio HIPAA compliant communication using telemedicine software.  The patient's identity has been confirmed.  The patient was previously emailed a copy of the telemedicine consent.  They have had a chance to review and has now given verbal consent and has requested care to be assessed and treated through telemedicine and understands there maybe limitations in this process and they may need further follow up care in the office and or hospital settings.   \par  \par Verbal consent given on 6/26/2020\par  \par The patient denies fevers, chills, nausea and or vomiting and no unexplained weight loss. \par  \par All pertinent parts of the patient PFSH (past medical, family and social histories), laboratory, radiological studies and physician notes were reviewed prior to starting the face to face portion of the telemedicine visit.  Questionnaire results were discussed with patient. \par \par CC: Personal history of prostate cancer\par \par HPI: Patient was referred by Dr. Olivera for prostate cancer diagnosis and I performed a RALP in 11/2019.  He has been doing well.  He had a GS7 cancer, T3a extraprostatic extension, with negative margins and nodes. \par \par He feels he has recovered well.  He wears one pad a day, it is damp and not heavily wet.  He feels he is making very good improvement.  \par \par Prior to surgery, patient noted that without medication, erections are probably not sufficient for vaginal penetration.  He states that his erections may be a bit better now.  He has not tried PDE5I at this point, feels like he wants to wait. \par \par FAMHX: Negative CAP \par SURGHX: Hernia, Appendectomy, RALP \par SOCIAL: Nonsmoker, , no children, active, exercise regularly \par ROS: Negative CV, RESP, GI

## 2021-02-08 NOTE — PRE-OP CHECKLIST - VIA
Health Call Center    Phone Message    May a detailed message be left on voicemail: yes     Reason for Call: Symptoms or Concerns     If patient has red-flag symptoms, warm transfer to triage line    Current symptom or concern: Extreme swelling and pain; however, patient is out of pain meds.  Area swollen is 10 X 10 and 3 inches high, per patient.  Area is not read and scrotum is not affected.    Symptoms have been present for:  2 day(s)    Has patient previously been seen for this? No    Are there any new or worsening symptoms? Yes: Swelling and pain      Action Taken: Message routed to:  [facility] ([facility]    Travel Screening: Not Applicable     ADDENDUM  02/08/21  8:42 AM  Please see Care Coordination note.  Kalina Holloway RN, BSN, CNOR, RNFA, CBCN  [facility] General Surgery                                                                         
wheelchair

## 2022-02-07 NOTE — PATIENT PROFILE ADULT - NSASFUNCLEVELADLTRANSFER_GEN_A_NUR
Patients GI provider:  Dr Jalloh Stamp    Number to return call: [phone]    Reason for call: Pt calling to schedule EGD      Scheduled procedure/appointment date if applicable: N/A 0 = independent

## 2022-11-02 NOTE — PATIENT PROFILE ADULT - TRANSPORTATION
[Normal TMs] : both tympanic membranes were normal [No Lymphadenopathy] : no lymphadenopathy [Supple] : supple [Normal] : normal rate, regular rhythm, normal S1 and S2 and no murmur heard [No Edema] : there was no peripheral edema [No Focal Deficits] : no focal deficits [Normal Affect] : the affect was normal [Normal Mood] : the mood was normal [Normal Insight/Judgement] : insight and judgment were intact [de-identified] : no calf tenderness b/l LE no

## 2023-03-10 NOTE — PATIENT PROFILE ADULT - NSPRESCRALCAMT_GEN_A_NUR
52 y/o male presents to [facility] for scheduled endoscopy on 10/13/23. He reports hx of abdominal pain which has since resolved, seen by PMD referred for diagnostic testing. Gastric nodule seen referred for endoscopy 1/2023, unable to remove referred to advance GI. 
1 or 2

## 2025-02-03 NOTE — PRE-OP CHECKLIST - RESPIRATORY RATE (BREATHS/MIN)
18
You can access the FollowMyHealth Patient Portal offered by [facility] by registering at the following website: [email or web address]. By joining [employer]’s FollowMyHealth portal, you will also be able to view your health information using other applications (apps) compatible with our system.